# Patient Record
Sex: FEMALE | Race: WHITE | NOT HISPANIC OR LATINO
[De-identification: names, ages, dates, MRNs, and addresses within clinical notes are randomized per-mention and may not be internally consistent; named-entity substitution may affect disease eponyms.]

---

## 2021-04-08 PROBLEM — Z00.00 ENCOUNTER FOR PREVENTIVE HEALTH EXAMINATION: Status: ACTIVE | Noted: 2021-04-08

## 2021-04-16 ENCOUNTER — APPOINTMENT (OUTPATIENT)
Dept: OBGYN | Facility: CLINIC | Age: 56
End: 2021-04-16
Payer: COMMERCIAL

## 2021-04-16 ENCOUNTER — RESULT CHARGE (OUTPATIENT)
Age: 56
End: 2021-04-16

## 2021-04-16 VITALS
SYSTOLIC BLOOD PRESSURE: 120 MMHG | HEIGHT: 68 IN | DIASTOLIC BLOOD PRESSURE: 84 MMHG | TEMPERATURE: 98.1 F | WEIGHT: 147 LBS | BODY MASS INDEX: 22.28 KG/M2

## 2021-04-16 DIAGNOSIS — R33.9 RETENTION OF URINE, UNSPECIFIED: ICD-10-CM

## 2021-04-16 DIAGNOSIS — R53.83 OTHER FATIGUE: ICD-10-CM

## 2021-04-16 DIAGNOSIS — N39.3 STRESS INCONTINENCE (FEMALE) (MALE): ICD-10-CM

## 2021-04-16 DIAGNOSIS — R63.5 ABNORMAL WEIGHT GAIN: ICD-10-CM

## 2021-04-16 DIAGNOSIS — Z78.9 OTHER SPECIFIED HEALTH STATUS: ICD-10-CM

## 2021-04-16 DIAGNOSIS — Z01.419 ENCOUNTER FOR GYNECOLOGICAL EXAMINATION (GENERAL) (ROUTINE) W/OUT ABNORMAL FINDINGS: ICD-10-CM

## 2021-04-16 DIAGNOSIS — Z86.018 PERSONAL HISTORY OF OTHER BENIGN NEOPLASM: ICD-10-CM

## 2021-04-16 DIAGNOSIS — Z86.39 PERSONAL HISTORY OF OTHER ENDOCRINE, NUTRITIONAL AND METABOLIC DISEASE: ICD-10-CM

## 2021-04-16 DIAGNOSIS — R39.15 URGENCY OF URINATION: ICD-10-CM

## 2021-04-16 DIAGNOSIS — Z02.82 ENCOUNTER FOR ADOPTION SERVICES: ICD-10-CM

## 2021-04-16 DIAGNOSIS — Z12.11 ENCOUNTER FOR SCREENING FOR MALIGNANT NEOPLASM OF COLON: ICD-10-CM

## 2021-04-16 DIAGNOSIS — F32.9 MAJOR DEPRESSIVE DISORDER, SINGLE EPISODE, UNSPECIFIED: ICD-10-CM

## 2021-04-16 DIAGNOSIS — M62.81 MUSCLE WEAKNESS (GENERALIZED): ICD-10-CM

## 2021-04-16 LAB
BILIRUB UR QL STRIP: NEGATIVE
GLUCOSE UR-MCNC: NEGATIVE
HCG UR QL: 0.2 EU/DL
HEMOCCULT SP1 STL QL: NEGATIVE
HGB UR QL STRIP.AUTO: NORMAL
KETONES UR-MCNC: NEGATIVE
LEUKOCYTE ESTERASE UR QL STRIP: NEGATIVE
NITRITE UR QL STRIP: NEGATIVE
PH UR STRIP: 5.5
PROT UR STRIP-MCNC: NEGATIVE
SP GR UR STRIP: 1.03

## 2021-04-16 PROCEDURE — 99386 PREV VISIT NEW AGE 40-64: CPT

## 2021-04-16 PROCEDURE — 81003 URINALYSIS AUTO W/O SCOPE: CPT | Mod: QW

## 2021-04-16 PROCEDURE — 99072 ADDL SUPL MATRL&STAF TM PHE: CPT

## 2021-04-16 PROCEDURE — 82270 OCCULT BLOOD FECES: CPT

## 2021-04-16 NOTE — HISTORY OF PRESENT ILLNESS
[Patient reported mammogram was normal] : Patient reported mammogram was normal [Patient reported PAP Smear was normal] : Patient reported PAP Smear was normal [Patient reported bone density results were normal] : Patient reported bone density results were normal [Patient reported colonoscopy was normal] : Patient reported colonoscopy was normal [postmenopausal] : postmenopausal [N] : Patient reports normal menses [Y] : Patient is sexually active [TextBox_4] :  54 YO FEMALE,HERE FOR ANNUAL EXAM\par HER LAST ANNUAL EXAM WAS: about 2018\par \par PREGNANCY HISTORY:  TOTAL   2   LIVE BIRTHS:  2    SAB:      IAB:\par \par SEXUALLY ACTIVE: yes\par LENGTH OF TIME IN RELATIONSHIP:\par \par MEDICAL HISTORY INCLUDES: Healthy hx with some urinary issues as incomplete emptying and urgency, alura\par FAMILY HISTORY IS SIGNIFICANT FOR: not available as pt is adopted\par \par LAST VISIT WITH PRIMARY DOCTOR:last time about 2018\par LAST BLOOD WORK:2018\par \par NUTRITIONAL INFO:so so eater, tries to eat healthy.   Could improve diet by having less take out. Ca rich foods:0-1  ADvised to try to take in 1 portion a day, eat well with green leafy veg etc\par CALCIUM INTAKE:SUPPLEMENTS: start citrate 250 daily on pc and if tolerated the one twice daily\par CORRECT USE OF CALCIUM SUPPLEMENTS WAS REVIEWED\par \par EXERCISES: runs, hikes regularly, goes to gym, about 2-3 times weekly\par  [Mammogramdate] : 2016 about [PapSmeardate] : 2018 [BoneDensityDate] : 2018  [ColonoscopyDate] : age 48  [TextBox_43] : unsure when to repeat [TextBox_102] : one year no menses [LMPDate] : age 54

## 2021-04-16 NOTE — PHYSICAL EXAM
[Appropriately responsive] : appropriately responsive [Alert] : alert [No Acute Distress] : no acute distress [No Lymphadenopathy] : no lymphadenopathy [Regular Rate Rhythm] : regular rate rhythm [No Murmurs] : no murmurs [Clear to Auscultation B/L] : clear to auscultation bilaterally [Soft] : soft [Non-tender] : non-tender [Non-distended] : non-distended [No HSM] : No HSM [No Lesions] : no lesions [No Mass] : no mass [Oriented x3] : oriented x3 [Examination Of The Breasts] : a normal appearance [No Masses] : no breast masses were palpable [Labia Majora] : normal [Labia Minora] : normal [Normal] : normal [Uterine Adnexae] : normal [Cystocele] : a cystocele [Retroversion] : retroverted

## 2021-04-16 NOTE — PLAN
[FreeTextEntry1] : Will dip urine to evaluate for abnormalities   Negative\par Pt is interested in getting pelvic floor muscles evaluated\par Referred also to uro, interested in [facility] since no family history and finally evaluation for being overwhelmed, depressed on and off for many years.

## 2021-04-16 NOTE — COUNSELING
[Nutrition/ Exercise/ Weight Management] : nutrition, exercise, weight management [Vitamins/Supplements] : vitamins/supplements [Breast Self Exam] : breast self exam [FreeTextEntry2] : bone health maintenance

## 2021-04-16 NOTE — REVIEW OF SYSTEMS
[Patient Intake Form Reviewed] : Patient intake form was reviewed [Negative] : Heme/Lymph [Fatigue] : fatigue [Recent Weight Gain (___ Lbs)] : recent [unfilled] ~Ulb weight gain [Urgency] : urgency [Incontinence] : incontinence [Depression] : depression [Muscle Weakness] : muscle weakness [FreeTextEntry8] : incomplete emptying

## 2021-04-19 LAB
C TRACH RRNA SPEC QL NAA+PROBE: NOT DETECTED
HPV HIGH+LOW RISK DNA PNL CVX: NOT DETECTED
N GONORRHOEA RRNA SPEC QL NAA+PROBE: NOT DETECTED
SOURCE TP AMPLIFICATION: NORMAL

## 2021-04-22 LAB — CYTOLOGY CVX/VAG DOC THIN PREP: NORMAL

## 2021-04-26 ENCOUNTER — NON-APPOINTMENT (OUTPATIENT)
Age: 56
End: 2021-04-26

## 2021-05-06 ENCOUNTER — NON-APPOINTMENT (OUTPATIENT)
Age: 56
End: 2021-05-06

## 2024-07-30 ENCOUNTER — APPOINTMENT (OUTPATIENT)
Age: 59
End: 2024-07-30
Payer: COMMERCIAL

## 2024-07-30 ENCOUNTER — OFFICE VISIT (OUTPATIENT)
Dept: FAMILY MEDICINE CLINIC | Facility: CLINIC | Age: 59
End: 2024-07-30
Payer: COMMERCIAL

## 2024-07-30 VITALS
WEIGHT: 147.2 LBS | HEIGHT: 68 IN | TEMPERATURE: 97.1 F | SYSTOLIC BLOOD PRESSURE: 118 MMHG | HEART RATE: 64 BPM | OXYGEN SATURATION: 98 % | BODY MASS INDEX: 22.31 KG/M2 | DIASTOLIC BLOOD PRESSURE: 88 MMHG

## 2024-07-30 DIAGNOSIS — Z12.31 ENCOUNTER FOR SCREENING MAMMOGRAM FOR BREAST CANCER: ICD-10-CM

## 2024-07-30 DIAGNOSIS — E05.00 GRAVES DISEASE: ICD-10-CM

## 2024-07-30 DIAGNOSIS — Z12.11 ENCOUNTER FOR COLORECTAL CANCER SCREENING: ICD-10-CM

## 2024-07-30 DIAGNOSIS — N32.81 OVERACTIVE BLADDER: ICD-10-CM

## 2024-07-30 DIAGNOSIS — R29.890 LOSS OF HEIGHT: ICD-10-CM

## 2024-07-30 DIAGNOSIS — Z78.0 POST-MENOPAUSAL: ICD-10-CM

## 2024-07-30 DIAGNOSIS — H53.9 VISUAL CHANGES: ICD-10-CM

## 2024-07-30 DIAGNOSIS — Z12.12 ENCOUNTER FOR COLORECTAL CANCER SCREENING: ICD-10-CM

## 2024-07-30 DIAGNOSIS — Z00.00 ROUTINE GENERAL MEDICAL EXAMINATION AT A HEALTH CARE FACILITY: Primary | ICD-10-CM

## 2024-07-30 DIAGNOSIS — M25.50 ARTHRALGIA, UNSPECIFIED JOINT: ICD-10-CM

## 2024-07-30 DIAGNOSIS — Z12.4 CERVICAL CANCER SCREENING: ICD-10-CM

## 2024-07-30 DIAGNOSIS — L25.5 RHUS DERMATITIS: ICD-10-CM

## 2024-07-30 LAB
ALBUMIN SERPL BCG-MCNC: 4.3 G/DL (ref 3.5–5)
ALP SERPL-CCNC: 66 U/L (ref 34–104)
ALT SERPL W P-5'-P-CCNC: 11 U/L (ref 7–52)
ANION GAP SERPL CALCULATED.3IONS-SCNC: 10 MMOL/L (ref 4–13)
AST SERPL W P-5'-P-CCNC: 15 U/L (ref 13–39)
B BURGDOR IGG+IGM SER QL IA: NEGATIVE
BASOPHILS # BLD AUTO: 0.04 THOUSANDS/ÂΜL (ref 0–0.1)
BASOPHILS NFR BLD AUTO: 1 % (ref 0–1)
BILIRUB SERPL-MCNC: 0.36 MG/DL (ref 0.2–1)
BUN SERPL-MCNC: 21 MG/DL (ref 5–25)
CALCIUM SERPL-MCNC: 9.6 MG/DL (ref 8.4–10.2)
CHLORIDE SERPL-SCNC: 103 MMOL/L (ref 96–108)
CHOLEST SERPL-MCNC: 245 MG/DL
CO2 SERPL-SCNC: 26 MMOL/L (ref 21–32)
CREAT SERPL-MCNC: 0.83 MG/DL (ref 0.6–1.3)
EOSINOPHIL # BLD AUTO: 0.15 THOUSAND/ÂΜL (ref 0–0.61)
EOSINOPHIL NFR BLD AUTO: 3 % (ref 0–6)
ERYTHROCYTE [DISTWIDTH] IN BLOOD BY AUTOMATED COUNT: 13 % (ref 11.6–15.1)
GFR SERPL CREATININE-BSD FRML MDRD: 76 ML/MIN/1.73SQ M
GLUCOSE P FAST SERPL-MCNC: 90 MG/DL (ref 65–99)
HCT VFR BLD AUTO: 41 % (ref 34.8–46.1)
HDLC SERPL-MCNC: 78 MG/DL
HGB BLD-MCNC: 13.3 G/DL (ref 11.5–15.4)
IMM GRANULOCYTES # BLD AUTO: 0 THOUSAND/UL (ref 0–0.2)
IMM GRANULOCYTES NFR BLD AUTO: 0 % (ref 0–2)
LDLC SERPL CALC-MCNC: 151 MG/DL (ref 0–100)
LYMPHOCYTES # BLD AUTO: 2.22 THOUSANDS/ÂΜL (ref 0.6–4.47)
LYMPHOCYTES NFR BLD AUTO: 44 % (ref 14–44)
MCH RBC QN AUTO: 32.4 PG (ref 26.8–34.3)
MCHC RBC AUTO-ENTMCNC: 32.4 G/DL (ref 31.4–37.4)
MCV RBC AUTO: 100 FL (ref 82–98)
MONOCYTES # BLD AUTO: 0.38 THOUSAND/ÂΜL (ref 0.17–1.22)
MONOCYTES NFR BLD AUTO: 8 % (ref 4–12)
NEUTROPHILS # BLD AUTO: 2.27 THOUSANDS/ÂΜL (ref 1.85–7.62)
NEUTS SEG NFR BLD AUTO: 44 % (ref 43–75)
NRBC BLD AUTO-RTO: 0 /100 WBCS
PLATELET # BLD AUTO: 255 THOUSANDS/UL (ref 149–390)
PMV BLD AUTO: 9.9 FL (ref 8.9–12.7)
POTASSIUM SERPL-SCNC: 4.4 MMOL/L (ref 3.5–5.3)
PROT SERPL-MCNC: 7.2 G/DL (ref 6.4–8.4)
RBC # BLD AUTO: 4.1 MILLION/UL (ref 3.81–5.12)
SODIUM SERPL-SCNC: 139 MMOL/L (ref 135–147)
T4 FREE SERPL-MCNC: 0.79 NG/DL (ref 0.61–1.12)
TRIGL SERPL-MCNC: 79 MG/DL
TSH SERPL DL<=0.05 MIU/L-ACNC: 1.85 UIU/ML (ref 0.45–4.5)
WBC # BLD AUTO: 5.06 THOUSAND/UL (ref 4.31–10.16)

## 2024-07-30 PROCEDURE — 36415 COLL VENOUS BLD VENIPUNCTURE: CPT | Performed by: FAMILY MEDICINE

## 2024-07-30 PROCEDURE — 99386 PREV VISIT NEW AGE 40-64: CPT | Performed by: FAMILY MEDICINE

## 2024-07-30 PROCEDURE — 84443 ASSAY THYROID STIM HORMONE: CPT | Performed by: FAMILY MEDICINE

## 2024-07-30 PROCEDURE — 3725F SCREEN DEPRESSION PERFORMED: CPT | Performed by: FAMILY MEDICINE

## 2024-07-30 PROCEDURE — 80053 COMPREHEN METABOLIC PANEL: CPT | Performed by: FAMILY MEDICINE

## 2024-07-30 PROCEDURE — 85025 COMPLETE CBC W/AUTO DIFF WBC: CPT | Performed by: FAMILY MEDICINE

## 2024-07-30 PROCEDURE — 80061 LIPID PANEL: CPT | Performed by: FAMILY MEDICINE

## 2024-07-30 PROCEDURE — 86618 LYME DISEASE ANTIBODY: CPT | Performed by: FAMILY MEDICINE

## 2024-07-30 PROCEDURE — 96372 THER/PROPH/DIAG INJ SC/IM: CPT | Performed by: FAMILY MEDICINE

## 2024-07-30 PROCEDURE — 84439 ASSAY OF FREE THYROXINE: CPT | Performed by: FAMILY MEDICINE

## 2024-07-30 RX ORDER — TRIAMCINOLONE ACETONIDE 1 MG/G
CREAM TOPICAL 2 TIMES DAILY
Qty: 45 G | Refills: 1 | Status: SHIPPED | OUTPATIENT
Start: 2024-07-30

## 2024-07-30 RX ORDER — DEXAMETHASONE SODIUM PHOSPHATE 10 MG/ML
10 INJECTION INTRAMUSCULAR; INTRAVENOUS ONCE
Status: COMPLETED | OUTPATIENT
Start: 2024-07-30 | End: 2024-07-30

## 2024-07-30 RX ORDER — OXYBUTYNIN CHLORIDE 10 MG/1
10 TABLET, EXTENDED RELEASE ORAL DAILY
Qty: 90 TABLET | Refills: 3 | Status: SHIPPED | OUTPATIENT
Start: 2024-07-30

## 2024-07-30 RX ORDER — OXYBUTYNIN CHLORIDE 10 MG/1
10 TABLET, EXTENDED RELEASE ORAL DAILY
COMMUNITY
Start: 2024-04-23 | End: 2024-07-30 | Stop reason: SDUPTHER

## 2024-07-30 RX ORDER — METHYLPREDNISOLONE 4 MG/1
TABLET ORAL
Qty: 21 EACH | Refills: 0 | Status: SHIPPED | OUTPATIENT
Start: 2024-07-30

## 2024-07-30 RX ADMIN — DEXAMETHASONE SODIUM PHOSPHATE 10 MG: 10 INJECTION INTRAMUSCULAR; INTRAVENOUS at 09:37

## 2024-07-30 NOTE — PROGRESS NOTES
Ambulatory Visit  Name: Mirian Solis      : 1965      MRN: [id number]  Encounter Provider: Racquel Jaime DO  Encounter Date: 2024   Encounter department: [facility]    Assessment & Plan   1. Routine general medical examination at a health care facility  Comments:  Discussed age-appropriate preventative recommendations today.  2. Cervical cancer screening  -     Ambulatory Referral to Obstetrics / Gynecology; Future  3. Encounter for colorectal cancer screening  -     Cologuard  4. Loss of height  -     DXA bone density spine hip and pelvis; Future; Expected date: 2024  5. Post-menopausal  -     DXA bone density spine hip and pelvis; Future; Expected date: 2024  6. Encounter for screening mammogram for breast cancer  -     Mammo screening bilateral w 3d & cad; Future; Expected date: 2024  7. Overactive bladder  -     oxybutynin (DITROPAN-XL) 10 MG 24 hr tablet; Take 1 tablet (10 mg total) by mouth daily  8. Graves disease  -     CBC and differential  -     Lipid Panel with Direct LDL reflex  -     TSH, 3rd generation  -     T4, free  -     Comprehensive metabolic panel  9. Arthralgia, unspecified joint  -     Lyme Total AB W Reflex to IGM/IGG  10. Visual changes  -     Ambulatory Referral to Ophthalmology; Future  11. Rhus dermatitis  -     dexamethasone (DECADRON) injection 10 mg  -     methylPREDNISolone 4 MG tablet therapy pack; Use as directed on package  -     triamcinolone (KENALOG) 0.1 % cream; Apply topically 2 (two) times a day      Depression Screening and Follow-up Plan: Patient was screened for depression during today's encounter. They screened negative with a PHQ-2 score of 2.      History of Present Illness     PMHx of OAB, DDD, plantar fasciitis. Prior hx Grave's and previously on tapazole which was stopped roughly 10 years ago. No surgeries. Pt does exercise. Wondering about supplements.     Due for mammo, bone density.  DEXA  "was done roughly 2-3 years ago and was normal.  Colonoscopy done 10 years ago, no polyps, no family history of colon cancer.  Last Pap and pelvic several years ago.  Requesting labs today.  Prior history of Lyme disease, does complain of diffuse back pain and joint pain.    Complains of pruritic vesicular rash on right arm, consistent with poison ivy.  Using topical steroid which is helping somewhat, but she reports she tends to get fairly severe symptoms.  Requesting steroid shot and oral steroids today.        Review of Systems   Constitutional:  Negative for activity change, appetite change and fever.   HENT:  Negative for trouble swallowing.    Respiratory:  Negative for apnea, cough, chest tightness and shortness of breath.    Cardiovascular:  Negative for chest pain, palpitations and leg swelling.   Gastrointestinal:  Negative for abdominal pain.   Musculoskeletal:  Positive for back pain. Negative for gait problem.   Skin:  Positive for rash.   Neurological:  Negative for dizziness and light-headedness.       Objective     /88 (BP Location: Left arm, Patient Position: Sitting, Cuff Size: Adult)   Pulse 64   Temp (!) 97.1 °F (36.2 °C) (Tympanic)   Ht 5' 8\" (1.727 m)   Wt 66.8 kg (147 lb 3.2 oz)   SpO2 98%   BMI 22.38 kg/m²     Physical Exam  Vitals and nursing note reviewed.   Constitutional:       General: She is not in acute distress.     Appearance: She is well-developed.   HENT:      Head: Normocephalic and atraumatic.   Eyes:      Conjunctiva/sclera: Conjunctivae normal.   Cardiovascular:      Rate and Rhythm: Normal rate and regular rhythm.      Heart sounds: No murmur heard.  Pulmonary:      Effort: Pulmonary effort is normal. No respiratory distress.      Breath sounds: Normal breath sounds.   Abdominal:      Palpations: Abdomen is soft.      Tenderness: There is no abdominal tenderness.   Musculoskeletal:         General: No swelling.      Cervical back: Neck supple.   Skin:     General: " Skin is warm and dry.      Capillary Refill: Capillary refill takes less than 2 seconds.      Findings: Rash present.   Neurological:      Mental Status: She is alert.   Psychiatric:         Mood and Affect: Mood normal.       Administrative Statements

## 2024-08-01 NOTE — RESULT ENCOUNTER NOTE
Nurses please notify patient of normal labs.  Thyroid, liver, kidney function and glucose are all normal.  Lipids are slightly elevated but they do not warrant a medication at this time.  Lyme test negative.  Repeat annually.

## 2024-08-05 ENCOUNTER — HOSPITAL ENCOUNTER (OUTPATIENT)
Dept: BONE DENSITY | Facility: HOSPITAL | Age: 59
Discharge: HOME | End: 2024-08-05
Payer: COMMERCIAL

## 2024-08-05 ENCOUNTER — HOSPITAL ENCOUNTER (OUTPATIENT)
Dept: MAMMOGRAPHY | Facility: HOSPITAL | Age: 59
Discharge: HOME | End: 2024-08-05
Payer: COMMERCIAL

## 2024-08-05 VITALS — HEIGHT: 68 IN | BODY MASS INDEX: 22.28 KG/M2 | WEIGHT: 147 LBS

## 2024-08-05 DIAGNOSIS — Z12.31 ENCOUNTER FOR SCREENING MAMMOGRAM FOR BREAST CANCER: ICD-10-CM

## 2024-08-05 DIAGNOSIS — R29.890 LOSS OF HEIGHT: ICD-10-CM

## 2024-08-05 DIAGNOSIS — Z78.0 POST-MENOPAUSAL: ICD-10-CM

## 2024-08-05 PROCEDURE — 77080 DXA BONE DENSITY AXIAL: CPT

## 2024-08-05 PROCEDURE — 77063 BREAST TOMOSYNTHESIS BI: CPT

## 2024-08-05 PROCEDURE — 77067 SCR MAMMO BI INCL CAD: CPT

## 2024-08-08 NOTE — RESULT ENCOUNTER NOTE
Nurses please notify pt of osteopenia noted on DEXA. Make sure she is taking 1200-1500mg calcium + 1000 U Vit D daily. Repeat scan in 2 yrs.

## 2024-08-17 LAB — COLOGUARD RESULT REPORTABLE: NEGATIVE

## 2024-10-14 NOTE — PROGRESS NOTES
[facility] [address] Parvin Cagle PA    ASSESSMENT/PLAN: Mirian Solis is a 59 y.o.  who presents for annual gynecologic exam.    Encounter for routine gynecologic examination  - Routine well woman exam completed today.  - Cervical Cancer Screening: Current ASCCP Guidelines reviewed. Last Pap: Not on file . Next Pap Due: today  - HPV Vaccination status: Not immunized  - STI screening offered including HIV: not indicated based on hx or requested at time of visit  - Breast Cancer Screening: Last Mammogram 2024, script in chart  - Colorectal cancer screening was not ordered.  - The following were reviewed in today's visit: breast self exam, mammography screening ordered, adequate intake of calcium and vitamin D, and exercise  - RTO 1 yr    Additional problems addressed at this visit:  1. Encounter for gynecological examination without abnormal finding  -     Liquid-based pap, screening  -     Mammo screening bilateral w 3d and cad; Future; Expected date: 2025  2. Cervical cancer screening  -     Ambulatory Referral to Obstetrics / Gynecology  -     Liquid-based pap, screening  3. Encounter for screening mammogram for breast cancer  -     Mammo screening bilateral w 3d and cad; Future; Expected date: 2025        CC:  Annual Gynecologic Examination    HPI: Mirian Solis is a 59 y.o.  who presents for annual gynecologic examination.  Mirian presents today for gyn exam. No vaginal bleeding since onset of menopause. unknown last pap smear, Hx of abnormal pap smear - no. Sexually active- yes, with partner for 30 yrs. Does not desire STI testing.  2024 mammogram- normal, and 2024 Cologuard- negative- repeat 3 yrs. Reports 7 hrs hrs of sleep daily, Occasional servings of calcium rich foods daily. Exercises 2-3 times per week. 2 servings of caffeine daily. Breast changes - none. Safe at home- yes.  Concerns: bone health.       The following portions of the  "patient's history were reviewed and updated as appropriate: allergies, current medications, past family history, past medical history, obstetric history, gynecologic history, past social history, past surgical history and problem list.    Review of Systems   Constitutional:  Negative for chills, fatigue and fever.   Respiratory:  Negative for cough and shortness of breath.    Cardiovascular:  Negative for chest pain, palpitations and leg swelling.   Gastrointestinal:  Negative for constipation and diarrhea.   Genitourinary:  Positive for frequency. Negative for difficulty urinating, dyspareunia, dysuria, pelvic pain, urgency, vaginal bleeding, vaginal discharge and vaginal pain.   Neurological:  Negative for light-headedness and headaches.   Psychiatric/Behavioral:  Negative for self-injury. The patient is not nervous/anxious.          Objective:  /72   Ht 5' 8\" (1.727 m)   Wt 67.6 kg (149 lb)   BMI 22.66 kg/m²    Physical Exam  Vitals reviewed.   Constitutional:       Appearance: Normal appearance.   HENT:      Head: Normocephalic.   Neck:      Thyroid: No thyroid mass or thyroid tenderness.   Cardiovascular:      Rate and Rhythm: Normal rate and regular rhythm.      Heart sounds: Normal heart sounds.   Pulmonary:      Effort: Pulmonary effort is normal.      Breath sounds: Normal breath sounds.   Chest:   Breasts:     Right: No mass, nipple discharge, skin change or tenderness.      Left: No mass, nipple discharge, skin change or tenderness.   Abdominal:      General: There is no distension.      Palpations: There is no mass.      Tenderness: There is no abdominal tenderness. There is no guarding.   Genitourinary:     General: Normal vulva.      Exam position: Lithotomy position.      Labia:         Right: No rash, tenderness or lesion.         Left: No rash, tenderness or lesion.       Urethra: No urethral pain, urethral swelling or urethral lesion.      Vagina: No vaginal discharge, erythema, " tenderness, bleeding or lesions.      Cervix: Friability present. No cervical motion tenderness, discharge, lesion, erythema or cervical bleeding.      Uterus: Normal. Not enlarged and not tender.       Adnexa:         Right: No mass, tenderness or fullness.          Left: No mass, tenderness or fullness.        Comments: Small amount atrophy posterior vagina  Musculoskeletal:      Cervical back: Normal range of motion.   Lymphadenopathy:      Upper Body:      Right upper body: No axillary adenopathy.      Left upper body: No axillary adenopathy.   Skin:     General: Skin is warm and dry.   Neurological:      Mental Status: She is alert.   Psychiatric:         Mood and Affect: Mood normal.         Behavior: Behavior normal.         Judgment: Judgment normal.             Melia Orourke CNM  [facility]  10/15/24 10:21 AM

## 2024-10-15 ENCOUNTER — OFFICE VISIT (OUTPATIENT)
Dept: OBGYN CLINIC | Facility: CLINIC | Age: 59
End: 2024-10-15
Payer: COMMERCIAL

## 2024-10-15 VITALS
HEIGHT: 68 IN | BODY MASS INDEX: 22.58 KG/M2 | DIASTOLIC BLOOD PRESSURE: 72 MMHG | WEIGHT: 149 LBS | SYSTOLIC BLOOD PRESSURE: 124 MMHG

## 2024-10-15 DIAGNOSIS — Z01.419 ENCOUNTER FOR GYNECOLOGICAL EXAMINATION WITHOUT ABNORMAL FINDING: Primary | ICD-10-CM

## 2024-10-15 DIAGNOSIS — Z12.31 ENCOUNTER FOR SCREENING MAMMOGRAM FOR BREAST CANCER: ICD-10-CM

## 2024-10-15 DIAGNOSIS — Z12.4 CERVICAL CANCER SCREENING: ICD-10-CM

## 2024-10-15 PROBLEM — L25.5 RHUS DERMATITIS: Status: RESOLVED | Noted: 2024-07-30 | Resolved: 2024-10-15

## 2024-10-15 PROCEDURE — G0476 HPV COMBO ASSAY CA SCREEN: HCPCS | Performed by: ADVANCED PRACTICE MIDWIFE

## 2024-10-15 PROCEDURE — 99386 PREV VISIT NEW AGE 40-64: CPT | Performed by: ADVANCED PRACTICE MIDWIFE

## 2024-10-15 PROCEDURE — G0145 SCR C/V CYTO,THINLAYER,RESCR: HCPCS | Performed by: ADVANCED PRACTICE MIDWIFE

## 2024-10-16 LAB
HPV HR 12 DNA CVX QL NAA+PROBE: NEGATIVE
HPV16 DNA CVX QL NAA+PROBE: NEGATIVE
HPV18 DNA CVX QL NAA+PROBE: NEGATIVE

## 2024-10-22 LAB
LAB AP GYN PRIMARY INTERPRETATION: NORMAL
Lab: NORMAL

## 2025-06-03 ENCOUNTER — OFFICE VISIT (OUTPATIENT)
Dept: PODIATRY | Facility: CLINIC | Age: 60
End: 2025-06-03
Payer: COMMERCIAL

## 2025-06-03 ENCOUNTER — APPOINTMENT (OUTPATIENT)
Dept: RADIOLOGY | Facility: CLINIC | Age: 60
End: 2025-06-03
Attending: PODIATRIST
Payer: COMMERCIAL

## 2025-06-03 VITALS — WEIGHT: 149 LBS | HEIGHT: 68 IN | BODY MASS INDEX: 22.58 KG/M2

## 2025-06-03 DIAGNOSIS — M79.672 PAIN IN BOTH FEET: Primary | ICD-10-CM

## 2025-06-03 DIAGNOSIS — M79.672 PAIN IN BOTH FEET: ICD-10-CM

## 2025-06-03 DIAGNOSIS — M24.50 CONTRACTURE OF JOINT: ICD-10-CM

## 2025-06-03 DIAGNOSIS — M79.671 PAIN IN BOTH FEET: Primary | ICD-10-CM

## 2025-06-03 DIAGNOSIS — Q66.71 CAVUS DEFORMITY OF BOTH FEET: ICD-10-CM

## 2025-06-03 DIAGNOSIS — M79.671 PAIN IN BOTH FEET: ICD-10-CM

## 2025-06-03 DIAGNOSIS — Q66.72 CAVUS DEFORMITY OF BOTH FEET: ICD-10-CM

## 2025-06-03 PROCEDURE — 99203 OFFICE O/P NEW LOW 30 MIN: CPT | Performed by: PODIATRIST

## 2025-06-03 PROCEDURE — 73630 X-RAY EXAM OF FOOT: CPT

## 2025-06-03 NOTE — PROGRESS NOTES
"Name: Mirian Solis      : 1965      MRN: [id number]  Encounter Provider: Toni Logan DPM  Encounter Date: 6/3/2025   Encounter department: [facility] [place]  :  Assessment & Plan  Pain in both feet    Orders:    XR foot 3+ vw left; Future    XR foot 3+ vw right; Future    Orthotics B/L  Recommended that she not tie her shoes too tight across the dorsum of the foot foot but to make sure they are tight just right so there is not an extra or a lot of shoe slipping in the area.  Contracture of joint    Orders:    Orthotics B/L    Cavus deformity of both feet    Orders:    Orthotics B/L    Return if symptoms worsen or fail to improve.     History of Present Illness   HPI  Mirian Solis is a 59 y.o. female who presents with chief complaint of pain on the top part of both feet.  Patient relates that it pain has been present for at least a year.  She notices most of the pain when walking and hiking.  She points to the top of her foot as the most prevalent area but no history of trauma or injury to it.  Patient has had custom inserts in the past and has been many years since she had new ones made.  History obtained from: patient    Review of Systems  Medical History Reviewed by provider this encounter:     .  Medications Ordered Prior to Encounter[1]   Social History[2]     Objective   Ht 5' 8\" (1.727 m)   Wt 67.6 kg (149 lb)   BMI 22.66 kg/m²     Personally reviewed the x-rays from today which show small exostosis in the midfoot joints but the joint spaces are clear and open.    Physical Exam  Vascular status is 2/4 DP PT negative digital hair, normal distal cooling, immediate capillary refill bilaterally.  Capillary refill is approximately 2 seconds bilaterally.    Derm small amount of white flaky tissue is present on the plantar aspect of both feet with no signs of any fissures or dried blood present.  No erythema is noted on the dorsal aspect of both feet.    Ortho there is mild " exostosis felt through the skin in the forefoot area around the midtarsal joint and the navicular cuneiform joints.  Plantarflexed first metatarsal with a cavus foot deformity noted bilaterally.  The forefoot drops plantarly compared to the rear foot on the horizontal plane.  The forefoot is slightly flexible where the metatarsals will dorsiflex and the midtarsal joint.    Neuro is intact and equal bilaterally for light touch.         [1]   Current Outpatient Medications on File Prior to Visit   Medication Sig Dispense Refill    oxybutynin (DITROPAN-XL) 10 MG 24 hr tablet Take 1 tablet (10 mg total) by mouth daily 90 tablet 3     No current facility-administered medications on file prior to visit.   [2]   Social History  Tobacco Use    Smoking status: Never    Smokeless tobacco: Never   Vaping Use    Vaping status: Never Used   Substance and Sexual Activity    Alcohol use: Yes     Alcohol/week: 2.0 standard drinks of alcohol     Types: 1 Glasses of wine, 1 Cans of beer per week     Comment: social    Drug use: Never    Sexual activity: Yes     Partners: Male     Birth control/protection: Post-menopausal

## 2025-06-03 NOTE — ASSESSMENT & PLAN NOTE
Orders:    XR foot 3+ vw left; Future    XR foot 3+ vw right; Future    Orthotics B/L  Recommended that she not tie her shoes too tight across the dorsum of the foot foot but to make sure they are tight just right so there is not an extra or a lot of shoe slipping in the area.

## 2025-06-09 ENCOUNTER — EVALUATION (OUTPATIENT)
Dept: PHYSICAL THERAPY | Facility: CLINIC | Age: 60
End: 2025-06-09
Payer: COMMERCIAL

## 2025-06-09 DIAGNOSIS — Q66.72 CAVUS DEFORMITY OF BOTH FEET: ICD-10-CM

## 2025-06-09 DIAGNOSIS — M79.672 PAIN IN BOTH FEET: Primary | ICD-10-CM

## 2025-06-09 DIAGNOSIS — Q66.71 CAVUS DEFORMITY OF BOTH FEET: ICD-10-CM

## 2025-06-09 DIAGNOSIS — M24.50 CONTRACTURE OF JOINT: ICD-10-CM

## 2025-06-09 DIAGNOSIS — M79.671 PAIN IN BOTH FEET: Primary | ICD-10-CM

## 2025-06-09 PROCEDURE — 97161 PT EVAL LOW COMPLEX 20 MIN: CPT

## 2025-06-09 NOTE — PROGRESS NOTES
Orthotic Evaluation    Today's date: 25  Patient name: Mirian Solis  : 1965  MRN: [id number]  Referring provider: No ref. provider found  Dx:   Encounter Diagnosis     ICD-10-CM    1. Pain in both feet  M79.671     M79.672       2. Contracture of joint  M24.50       3. Cavus deformity of both feet  Q66.71     Q66.72           Start Time: 1400  Stop Time: 1445  Total time in clinic (min): 45 minutes     Subjective:    Mirian presents today for orthotic evaluation. she complains of pain, decreased joint mobility, and ambulatory dysfunction with functional activities including ambulation, leisure, and athletics. The patient plans to use her orthotic for athletics, walking, and standing. The orthotic will be placed in a standard, size 10 [place].    Objective:    Age: 59 y.o.  Weight: 149lbs    Foot Posture Index Score    Right Foot  Talar dome - 0  Talonavicular bulge - 0  Medial longitudinal arch - 0  Malleolar curve - 0   Calcaneal eversion - 0  Too many toes - 0  Total Score R = 0    Left Foot  Talar dome - +1  Talonavicular bulge - 0  Medial longitudinal arch - 0  Malleolar curve - +1   Calcaneal eversion - 0  Too many toes - 0  Total Score L = 2    Reference Values  Normal =    0 to +5  Pronated =  +6 to +9 Highly Pronated =  10+  Supinated =   -1 to -4 Highly Supinated = -5 to -12    Objective     Neurological Testing     Sensation     Ankle/Foot   Left Ankle/Foot   Intact: light touch    Right Ankle/Foot   Intact: light touch     Passive Range of Motion   Left Ankle/Foot    Dorsiflexion (ke): 15 degrees   Plantar flexion: 80 degrees   Inversion: 25 degrees   Eversion: 15 degrees   Great toe extension: 90 degrees     Right Ankle/Foot    Dorsiflexion (ke): 15 degrees   Plantar flexion: 80 degrees   Inversion: 25 degrees   Eversion: 15 degrees   Great toe extension: 90 degrees     Joint Play   Left Ankle/Foot  Hypomobile in the talocrural joint, subtalar joint, midfoot and forefoot.     Right  Ankle/Foot  Joints within functional limits are the talocrural joint. Hypomobile in the subtalar joint, midfoot and forefoot.     Strength/Myotome Testing     Left Ankle/Foot   Normal strength    Right Ankle/Foot   Normal strength    Ambulation     Comments   Early excessive pronation during gait, more prominent in rearfoot. Early heel rise bilaterally.      Assessment:    Patient requires custom foot orthosis with deepened heel cup, arch support, and lateral posting to correct altered gait mechanics. Patient requested rigid orthotic because she has a history of rigid orthotics that have worked for her in the past. Patient is not currently controlled by her current shoe wear. Patient was educated on the design of the custom orthotic and it's ability to offload her current pathology. Patient was also educated on potential shoe wear changes with device, break-in period, and skin checks to avoid potential skin break down.     Orthotic goals:  1) Patient will have custom foot orthoses fitted to her shoe.   2) Patient will be compliant with break-in period of custom foot orthoses as prescribed by PT.   3) Patient will be compliant with custom foot orthoses use as prescribed by PT.     Plan:    Planned therapy interventions: orthotic fitting/training  Duration in visits: 2    Access Code: [email or web address]  URL: [email or web address]  Date: 06/09/2025  Prepared by: Alberto Beach    Exercises  - Towel Scrunches  - 1 x daily - 7 x weekly - 3 sets - 10 reps  - Toe Yoga - Alternating Great Toe and Lesser Toe Extension  - 1 x daily - 7 x weekly - 3 sets - 10 reps  - Seated Plantar Fascia Stretch  - 2 x daily - 7 x weekly - 3 sets - 30 hold  - Seated Plantar Fascia Mobilization with Small Ball  - 2 x daily - 7 x weekly - 1 sets - 5 minutes hold  - Calf stretch - 2 x daily - 7 x weekly - 1 sets - 2 minutes hold

## 2025-07-03 ENCOUNTER — OFFICE VISIT (OUTPATIENT)
Dept: PHYSICAL THERAPY | Facility: CLINIC | Age: 60
End: 2025-07-03
Attending: PODIATRIST
Payer: COMMERCIAL

## 2025-07-03 DIAGNOSIS — M79.672 PAIN IN BOTH FEET: Primary | ICD-10-CM

## 2025-07-03 DIAGNOSIS — M79.671 PAIN IN BOTH FEET: Primary | ICD-10-CM

## 2025-07-03 DIAGNOSIS — M24.50 CONTRACTURE OF JOINT: ICD-10-CM

## 2025-07-03 DIAGNOSIS — Q66.71 CAVUS DEFORMITY OF BOTH FEET: ICD-10-CM

## 2025-07-03 DIAGNOSIS — Q66.72 CAVUS DEFORMITY OF BOTH FEET: ICD-10-CM

## 2025-07-03 PROCEDURE — L3010 FOOT LONGITUDINAL ARCH SUPPO: HCPCS

## 2025-07-03 NOTE — PROGRESS NOTES
Custom orthotics fitted to patients shoe and dispensed. Patient educated on appropriate break in period. Patient will follow up if any future problems arise.     Alberto Beach, PT  7/3/2025  10:27 AM

## 2025-08-21 DIAGNOSIS — N32.81 OVERACTIVE BLADDER: ICD-10-CM

## 2025-08-21 RX ORDER — OXYBUTYNIN CHLORIDE 10 MG/1
10 TABLET, EXTENDED RELEASE ORAL DAILY
Qty: 30 TABLET | Refills: 0 | Status: SHIPPED | OUTPATIENT
Start: 2025-08-21